# Patient Record
Sex: MALE | Race: WHITE | NOT HISPANIC OR LATINO | URBAN - NONMETROPOLITAN AREA
[De-identification: names, ages, dates, MRNs, and addresses within clinical notes are randomized per-mention and may not be internally consistent; named-entity substitution may affect disease eponyms.]

---

## 2019-10-17 ENCOUNTER — IMPORTED ENCOUNTER (OUTPATIENT)
Dept: URBAN - NONMETROPOLITAN AREA CLINIC 1 | Facility: CLINIC | Age: 84
End: 2019-10-17

## 2019-10-17 PROBLEM — H35.3132: Noted: 2019-10-17

## 2019-10-17 PROBLEM — H35.373: Noted: 2019-10-17

## 2019-10-17 PROCEDURE — 99213 OFFICE O/P EST LOW 20 MIN: CPT

## 2019-10-17 PROCEDURE — 92134 CPTRZ OPH DX IMG PST SGM RTA: CPT

## 2019-10-17 NOTE — PATIENT DISCUSSION
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 -Continue Amsler grid monitoring daily. Pt is to contact our office if any changes are noted. -Order OCT [facility] today performed and reviewed w/pt AMD with lesion in R fovea -refer to Retina ASAP for IVFA Letter to Rojelio Mercedes

## 2022-04-10 ASSESSMENT — VISUAL ACUITY
OD_SC: 20/40+1
OS_SC: 20/30-2

## 2022-04-10 ASSESSMENT — TONOMETRY
OS_IOP_MMHG: 18
OD_IOP_MMHG: 21